# Patient Record
Sex: MALE | Race: OTHER | URBAN - METROPOLITAN AREA
[De-identification: names, ages, dates, MRNs, and addresses within clinical notes are randomized per-mention and may not be internally consistent; named-entity substitution may affect disease eponyms.]

---

## 2018-09-02 ENCOUNTER — EMERGENCY (EMERGENCY)
Facility: HOSPITAL | Age: 43
LOS: 1 days | Discharge: HOME | End: 2018-09-02
Attending: STUDENT IN AN ORGANIZED HEALTH CARE EDUCATION/TRAINING PROGRAM
Payer: COMMERCIAL

## 2018-09-02 VITALS
OXYGEN SATURATION: 97 % | TEMPERATURE: 97 F | SYSTOLIC BLOOD PRESSURE: 156 MMHG | HEART RATE: 89 BPM | WEIGHT: 270.07 LBS | DIASTOLIC BLOOD PRESSURE: 104 MMHG | HEIGHT: 69 IN | RESPIRATION RATE: 18 BRPM

## 2018-09-02 VITALS
SYSTOLIC BLOOD PRESSURE: 128 MMHG | DIASTOLIC BLOOD PRESSURE: 80 MMHG | RESPIRATION RATE: 18 BRPM | HEART RATE: 101 BPM | TEMPERATURE: 98 F | OXYGEN SATURATION: 95 %

## 2018-09-02 PROCEDURE — 99284 EMERGENCY DEPT VISIT MOD MDM: CPT

## 2018-09-02 PROCEDURE — 99283 EMERGENCY DEPT VISIT LOW MDM: CPT

## 2018-09-02 RX ORDER — EPINEPHRINE 0.3 MG/.3ML
0.3 INJECTION INTRAMUSCULAR; SUBCUTANEOUS
Qty: 1 | Refills: 0 | OUTPATIENT
Start: 2018-09-02

## 2018-09-02 RX ORDER — FAMOTIDINE 10 MG/ML
1 INJECTION INTRAVENOUS
Qty: 10 | Refills: 0 | OUTPATIENT
Start: 2018-09-02 | End: 2018-09-06

## 2018-09-02 RX ORDER — DIPHENHYDRAMINE HCL 50 MG
1 CAPSULE ORAL
Qty: 16 | Refills: 0 | OUTPATIENT
Start: 2018-09-02 | End: 2018-09-05

## 2018-09-02 NOTE — ED PROVIDER NOTE - PROGRESS NOTE DETAILS
Pt has been placed on pulse ox to monitor his oxygenation for the next several hours. Patient resting comfortably. States he no longer wants to remain in the ED despite my suggesting we watch at least one additional hour. Patient voiced his understanding. Cautioned patient regarding elevated blood sugar while taking steroids.

## 2018-09-02 NOTE — ED ADULT NURSE NOTE - NSIMPLEMENTINTERV_GEN_ALL_ED
Implemented All Universal Safety Interventions:  [place] to call system. Call bell, personal items and telephone within reach. Instruct patient to call for assistance. Room bathroom lighting operational. Non-slip footwear when patient is off stretcher. Physically safe environment: no spills, clutter or unnecessary equipment. Stretcher in lowest position, wheels locked, appropriate side rails in place.

## 2018-09-02 NOTE — ED PROVIDER NOTE - OBJECTIVE STATEMENT
42 y/o M pt with PMHx DM presents to the ED BIBA c/o a bee sting to his L hand 2 hours ago.  After he was stung he began to feel facial tingling, numbness, nausea and SOB.  He has an Epipen so he decided to use it, stuck it in his L anterior thigh.  He was given steroids and benadryl by EMS en rout to the ED.  This is the second time he has been stung by a bee, the first time he passed out.  Denies vomiting, bleeding.  No further acute complaints at this time. 42 y/o M pt with PMHx DM presents to the ED BIBA c/o a bee sting to his L hand 4th finger 2 hours ago.  After he was stung he began to feel facial tingling, numbness, nausea and SOB.  He has an Epipen so he decided to use it, stuck it in his L anterior thigh.  He was given steroids and benadryl by EMS en route to the ED.  This is the second time he has been stung by a bee, the first time he passed out.  Denies vomiting, bleeding.  No further acute complaints at this time.

## 2018-09-02 NOTE — ED ADULT TRIAGE NOTE - CHIEF COMPLAINT QUOTE
Patient BIBA, stung by a bee to left ring finger, allergic, wife gave patient epi-pen, as per EMS gave 50mg benadryl IVP, 125mg solumedrol IVP, was given a combi treatment, states feeling relief at this time

## 2018-09-02 NOTE — ED ADULT NURSE NOTE - OBJECTIVE STATEMENT
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 lung sounds clear bilaterally patient does not appear to be in any respiratory distress at this time. aware of plan will continue to monitor patient

## 2018-09-02 NOTE — ED PROVIDER NOTE - MEDICAL DECISION MAKING DETAILS
Pt with allergic reaction to bee sting, treated with steroids and Epipen.  Will observe for return of symptoms.

## 2018-09-02 NOTE — ED ADULT NURSE REASSESSMENT NOTE - NS ED NURSE REASSESS COMMENT FT1
patient states that he feels ok and wants to go home. Dr. Cheng made aware will come to eval patient

## 2019-03-14 NOTE — ED ADULT NURSE NOTE - NS ED NURSE LEVEL OF CONSCIOUSNESS SPEECH
Speaking Coherently Surgeon/Pathologist Verbiage (Will Incorporate Name Of Surgeon From Intro If Not Blank): operated in two distinct and integrated capacities as the surgeon and pathologist.

## 2023-01-27 ENCOUNTER — OFFICE (OUTPATIENT)
Dept: URBAN - METROPOLITAN AREA CLINIC 103 | Facility: CLINIC | Age: 48
Setting detail: OPHTHALMOLOGY
End: 2023-01-27
Payer: COMMERCIAL

## 2023-01-27 DIAGNOSIS — E11.3311: ICD-10-CM

## 2023-01-27 DIAGNOSIS — E11.3392: ICD-10-CM

## 2023-01-27 DIAGNOSIS — H10.45: ICD-10-CM

## 2023-01-27 PROCEDURE — 92250 FUNDUS PHOTOGRAPHY W/I&R: CPT | Performed by: OPHTHALMOLOGY

## 2023-01-27 PROCEDURE — 92014 COMPRE OPH EXAM EST PT 1/>: CPT | Performed by: OPHTHALMOLOGY

## 2023-01-27 ASSESSMENT — REFRACTION_MANIFEST
OD_VA1: 20/20-1
OD_VA1: 20/20
OD_CYLINDER: -0.50
OS_VA1: 20/25-1
OS_SPHERE: -7.25
OD_SPHERE: -5.00
OD_CYLINDER: -1.25
OS_AXIS: 150
OS_VA1: 20/20-
OS_SPHERE: -8.00
OS_CYLINDER: -0.75
OD_AXIS: 010
OS_CYLINDER: -1.50
OU_VA: 20/20-1
OD_SPHERE: -5.50
OD_AXIS: 010
OS_AXIS: 165

## 2023-01-27 ASSESSMENT — REFRACTION_CURRENTRX
OS_VPRISM_DIRECTION: SV
OS_CYLINDER: -0.75
OS_AXIS: 166
OD_SPHERE: -5.00
OD_VPRISM_DIRECTION: SV
OS_SPHERE: -7.25
OS_OVR_VA: 20/
OD_OVR_VA: 20/
OD_AXIS: 180
OD_CYLINDER: -0.50

## 2023-01-27 ASSESSMENT — SPHEQUIV_DERIVED
OD_SPHEQUIV: -5.125
OD_SPHEQUIV: -6.125
OS_SPHEQUIV: -8.75
OS_SPHEQUIV: -8.125
OS_SPHEQUIV: -7.625
OD_SPHEQUIV: -5.25

## 2023-01-27 ASSESSMENT — AXIALLENGTH_DERIVED
OD_AL: 26.166
OS_AL: 27.83
OD_AL: 26.5922
OD_AL: 26.1063
OS_AL: 27.2332
OS_AL: 27.4952

## 2023-01-27 ASSESSMENT — KERATOMETRY
OD_AXISANGLE_DEGREES: 092
OS_AXISANGLE_DEGREES: 079
OD_K1POWER_DIOPTERS: 42.25
OS_K2POWER_DIOPTERS: 43.50
OD_K2POWER_DIOPTERS: 43.25
OS_K1POWER_DIOPTERS: 42.50

## 2023-01-27 ASSESSMENT — TONOMETRY
OS_IOP_MMHG: 16
OD_IOP_MMHG: 14

## 2023-01-27 ASSESSMENT — REFRACTION_AUTOREFRACTION
OD_SPHERE: -4.75
OD_CYLINDER: -0.75
OD_AXIS: 012
OS_CYLINDER: -0.75
OS_AXIS: 152
OS_SPHERE: -7.75

## 2023-01-27 ASSESSMENT — CONFRONTATIONAL VISUAL FIELD TEST (CVF)
OS_FINDINGS: FULL
OD_FINDINGS: FULL

## 2023-01-27 ASSESSMENT — VISUAL ACUITY
OD_BCVA: 20/40-2
OS_BCVA: 20/30

## 2023-02-08 ENCOUNTER — OFFICE (OUTPATIENT)
Dept: URBAN - METROPOLITAN AREA CLINIC 103 | Facility: CLINIC | Age: 48
Setting detail: OPHTHALMOLOGY
End: 2023-02-08
Payer: COMMERCIAL

## 2023-02-08 DIAGNOSIS — E11.3312: ICD-10-CM

## 2023-02-08 DIAGNOSIS — E11.3391: ICD-10-CM

## 2023-02-08 PROCEDURE — 92235 FLUORESCEIN ANGRPH MLTIFRAME: CPT | Performed by: OPHTHALMOLOGY

## 2023-02-08 PROCEDURE — 67210 TREATMENT OF RETINAL LESION: CPT | Performed by: OPHTHALMOLOGY

## 2023-02-08 PROCEDURE — 92134 CPTRZ OPH DX IMG PST SGM RTA: CPT | Performed by: OPHTHALMOLOGY

## 2023-02-08 ASSESSMENT — CONFRONTATIONAL VISUAL FIELD TEST (CVF)
OD_FINDINGS: FULL
OS_FINDINGS: FULL

## 2023-02-08 ASSESSMENT — VISUAL ACUITY
OS_BCVA: 20/20
OD_BCVA: 20/30-2

## 2023-02-08 ASSESSMENT — REFRACTION_AUTOREFRACTION
OS_CYLINDER: -0.75
OS_SPHERE: -7.75
OD_AXIS: 012
OD_SPHERE: -4.75
OS_AXIS: 152
OD_CYLINDER: -0.75

## 2023-02-08 ASSESSMENT — KERATOMETRY
OS_K1POWER_DIOPTERS: 42.50
OS_AXISANGLE_DEGREES: 079
OD_K2POWER_DIOPTERS: 43.25
OD_K1POWER_DIOPTERS: 42.25
OD_AXISANGLE_DEGREES: 092
OS_K2POWER_DIOPTERS: 43.50

## 2023-02-08 ASSESSMENT — SPHEQUIV_DERIVED
OD_SPHEQUIV: -5.125
OS_SPHEQUIV: -8.125

## 2023-02-08 ASSESSMENT — TONOMETRY
OS_IOP_MMHG: 17
OD_IOP_MMHG: 15

## 2023-02-08 ASSESSMENT — AXIALLENGTH_DERIVED
OS_AL: 27.4952
OD_AL: 26.1063

## 2023-02-21 PROBLEM — Z00.00 ENCOUNTER FOR PREVENTIVE HEALTH EXAMINATION: Status: ACTIVE | Noted: 2023-02-21

## 2023-06-05 ENCOUNTER — OFFICE (OUTPATIENT)
Dept: URBAN - METROPOLITAN AREA CLINIC 103 | Facility: CLINIC | Age: 48
Setting detail: OPHTHALMOLOGY
End: 2023-06-05
Payer: COMMERCIAL

## 2023-06-05 DIAGNOSIS — E11.3311: ICD-10-CM

## 2023-06-05 DIAGNOSIS — E11.3313: ICD-10-CM

## 2023-06-05 PROCEDURE — 92012 INTRM OPH EXAM EST PATIENT: CPT | Performed by: OPHTHALMOLOGY

## 2023-06-05 PROCEDURE — 67210 TREATMENT OF RETINAL LESION: CPT | Performed by: OPHTHALMOLOGY

## 2023-06-05 PROCEDURE — 92235 FLUORESCEIN ANGRPH MLTIFRAME: CPT | Performed by: OPHTHALMOLOGY

## 2023-06-05 PROCEDURE — 92134 CPTRZ OPH DX IMG PST SGM RTA: CPT | Performed by: OPHTHALMOLOGY

## 2023-06-05 ASSESSMENT — REFRACTION_AUTOREFRACTION
OD_CYLINDER: -0.75
OS_AXIS: 152
OD_SPHERE: -4.75
OS_CYLINDER: -0.75
OS_SPHERE: -7.75
OD_AXIS: 012

## 2023-06-05 ASSESSMENT — VISUAL ACUITY
OS_BCVA: 20/20
OD_BCVA: 20/30-2

## 2023-06-05 ASSESSMENT — TONOMETRY
OD_IOP_MMHG: 15
OS_IOP_MMHG: 15

## 2023-06-05 ASSESSMENT — SPHEQUIV_DERIVED
OD_SPHEQUIV: -5.125
OS_SPHEQUIV: -8.125

## 2023-06-05 ASSESSMENT — CONFRONTATIONAL VISUAL FIELD TEST (CVF)
OS_FINDINGS: FULL
OD_FINDINGS: FULL

## 2023-06-05 ASSESSMENT — KERATOMETRY
OD_AXISANGLE_DEGREES: 092
OS_K2POWER_DIOPTERS: 43.50
OS_K1POWER_DIOPTERS: 42.50
OD_K2POWER_DIOPTERS: 43.25
OS_AXISANGLE_DEGREES: 079
OD_K1POWER_DIOPTERS: 42.25

## 2023-06-05 ASSESSMENT — AXIALLENGTH_DERIVED
OD_AL: 26.1063
OS_AL: 27.4952

## 2023-07-17 LAB
HBA1C MFR BLD HPLC: 10.8
LDLC SERPL DIRECT ASSAY-MCNC: 100
TSH SERPL-ACNC: 2.7

## 2023-07-18 ENCOUNTER — APPOINTMENT (OUTPATIENT)
Dept: ENDOCRINOLOGY | Facility: CLINIC | Age: 48
End: 2023-07-18